# Patient Record
Sex: MALE | Race: WHITE | NOT HISPANIC OR LATINO | Employment: FULL TIME | URBAN - METROPOLITAN AREA
[De-identification: names, ages, dates, MRNs, and addresses within clinical notes are randomized per-mention and may not be internally consistent; named-entity substitution may affect disease eponyms.]

---

## 2017-06-07 ENCOUNTER — OFFICE VISIT (OUTPATIENT)
Dept: SPORTS MEDICINE | Facility: CLINIC | Age: 32
End: 2017-06-07

## 2017-06-07 VITALS
RESPIRATION RATE: 16 BRPM | HEIGHT: 71 IN | OXYGEN SATURATION: 99 % | DIASTOLIC BLOOD PRESSURE: 84 MMHG | BODY MASS INDEX: 24.16 KG/M2 | SYSTOLIC BLOOD PRESSURE: 110 MMHG | WEIGHT: 172.6 LBS | HEART RATE: 62 BPM

## 2017-06-07 DIAGNOSIS — K64.4 EXTERNAL HEMORRHOID: Primary | ICD-10-CM

## 2017-06-07 PROCEDURE — 99213 OFFICE O/P EST LOW 20 MIN: CPT | Performed by: FAMILY MEDICINE

## 2017-06-07 RX ORDER — POLYETHYLENE GLYCOL 3350 17 G/17G
17 POWDER, FOR SOLUTION ORAL DAILY
Qty: 500 G | Refills: 1 | Status: SHIPPED | OUTPATIENT
Start: 2017-06-07

## 2017-06-07 NOTE — PROGRESS NOTES
"Tawanda is a 32 y.o. year old male    Chief Complaint   Patient presents with   • Hemorrhoids     wants to discuss treatment or possible referral       History of Present Illness   HPI   Rectal pain on and off for a few years, worsened last week. Sharp pain with BM and wiping, no bleeding. No change to BMs. No increased exercise. Somewhat better in the past few days. Assoc bulging sensation.     I have reviewed the patient's medical history in detail and updated the computerized patient record.    Review of Systems   Constitutional: Negative.    Gastrointestinal: Negative for blood in stool.       /84 (BP Location: Left arm, Patient Position: Sitting, Cuff Size: Adult)  Pulse 62  Resp 16  Ht 71\" (180.3 cm)  Wt 172 lb 9.6 oz (78.3 kg)  SpO2 99%  BMI 24.07 kg/m2     Physical Exam   Constitutional: He appears well-developed and well-nourished.   Genitourinary: Rectal exam shows external hemorrhoid (2:00, about 5mm diameter).   Vitals reviewed.       Diagnoses and all orders for this visit:    External hemorrhoid    Other orders  -     hydrocortisone 2.5 % cream; Apply  topically 2 (Two) Times a Day.  -     polyethylene glycol (MIRALAX) powder; Take 17 g by mouth Daily.  Discussed management of resolving hemorrhoids in detail.  "

## 2019-02-05 ENCOUNTER — OFFICE VISIT (OUTPATIENT)
Dept: SPORTS MEDICINE | Facility: CLINIC | Age: 34
End: 2019-02-05

## 2019-02-05 VITALS
SYSTOLIC BLOOD PRESSURE: 100 MMHG | WEIGHT: 159 LBS | DIASTOLIC BLOOD PRESSURE: 70 MMHG | BODY MASS INDEX: 22.26 KG/M2 | HEIGHT: 71 IN

## 2019-02-05 DIAGNOSIS — R19.7 DIARRHEA OF PRESUMED INFECTIOUS ORIGIN: Primary | ICD-10-CM

## 2019-02-05 PROCEDURE — 99213 OFFICE O/P EST LOW 20 MIN: CPT | Performed by: FAMILY MEDICINE

## 2019-02-05 NOTE — PROGRESS NOTES
"Tawanda is a 34 y.o. year old male    Chief Complaint   Patient presents with   • Diarrhea     x Thursday evening, severe, min appetite, cramping, bloating        History of Present Illness   HPI   Illness started Thursday evening with upset stomach and loss of appetite, worsened over the following days. Felt chilled poss low grade fever. Diarrhea started Saturday, intense for several hours, better with Imodium. Again had intense diarrhea Monday (yesterday) morning, again improved with Imodium. Yesterday evening again but did not improve. Somewhat better this morning. Watery, no blood.  No vomiting. Tolerating fluids and some solids just no appetite.     I have reviewed the patient's medical, family, and social history in detail and updated the computerized patient record.    Review of Systems   Constitutional: Positive for fatigue.   Eyes: Negative.    Respiratory: Negative for shortness of breath.    Gastrointestinal: Negative for blood in stool and vomiting.   Genitourinary: Negative for difficulty urinating.   Musculoskeletal: Negative for arthralgias.   Skin: Negative for rash.       /70 (BP Location: Right arm, Patient Position: Sitting, Cuff Size: Large Adult)   Ht 180.3 cm (70.98\")   Wt 72.1 kg (159 lb)   BMI 22.19 kg/m²      Physical Exam   Constitutional: He appears well-developed and well-nourished. No distress.   HENT:   Mouth/Throat: Oropharynx is clear and moist.   Eyes: Conjunctivae are normal. Pupils are equal, round, and reactive to light.   Neck: Normal range of motion.   Pulmonary/Chest: Effort normal and breath sounds normal.   Abdominal: Soft. Bowel sounds are normal. He exhibits no distension and no mass. There is no rebound and no guarding.   Nonspecific generalized mild tenderness. No organomegaly   Skin: Skin is warm and dry. He is not diaphoretic.   Psychiatric: He has a normal mood and affect.   Nursing note and vitals reviewed.        Current Outpatient Medications:   •  " hydrocortisone 2.5 % cream, Apply  topically 2 (Two) Times a Day., Disp: 28 g, Rfl: 2  •  polyethylene glycol (MIRALAX) powder, Take 17 g by mouth Daily., Disp: 500 g, Rfl: 1     Diagnoses and all orders for this visit:    Diarrhea of presumed infectious origin    Suspect prolonged viral illness. Discussed supportive care. F/u if not improving or red flag symptoms appear.      I spent greater than 50% of this 15 minute visit discussing the diagnosis, prognosis, treatment plan, etc. Patient's questions were answered in detail with appropriate counseling and anticipatory guidance.          EMR Dragon/Transcription disclaimer:    Much of this encounter note is an electronic transcription/translation of spoken language to printed text.  The electronic translation of spoken language may permit erroneous, or at times, nonsensical words or phrases to be inadvertently transcribed.  Although I have reviewed the note for such errors some may still exist.

## 2019-02-07 ENCOUNTER — TELEPHONE (OUTPATIENT)
Dept: SPORTS MEDICINE | Facility: CLINIC | Age: 34
End: 2019-02-07

## 2019-02-07 RX ORDER — CIPROFLOXACIN 500 MG/1
500 TABLET, FILM COATED ORAL 2 TIMES DAILY
Qty: 14 TABLET | Refills: 0 | Status: SHIPPED | OUTPATIENT
Start: 2019-02-07

## 2019-02-07 NOTE — TELEPHONE ENCOUNTER
I sent in cipro to his pharmacy. Let's have him schedule follow up Monday but if he still has fevers after 2-3 doses of the antibiotic he needs to go to the ER.

## 2019-02-07 NOTE — TELEPHONE ENCOUNTER
PT called in stating that he took his temp under his arm and it was 101.3. He stated that you advised for him to reach out if he's had any new symptoms. If you call something in he would like for it to go to [facility].     He stated that he hasn't had any more diarrhea, but still cramping and bloating.     Please advise, thank you.

## 2021-04-16 ENCOUNTER — BULK ORDERING (OUTPATIENT)
Dept: CASE MANAGEMENT | Facility: OTHER | Age: 36
End: 2021-04-16

## 2021-04-16 DIAGNOSIS — Z23 IMMUNIZATION DUE: ICD-10-CM

## 2023-04-04 ENCOUNTER — TELEPHONE (OUTPATIENT)
Dept: SPORTS MEDICINE | Facility: CLINIC | Age: 38
End: 2023-04-04
Payer: COMMERCIAL

## 2023-04-04 NOTE — TELEPHONE ENCOUNTER
Patient called to re-establish care with you. Patient stated that he has not been seen with you for a physical because back in 2019 he received a letter from our office stating that he was not in network.   Patient stated that he had [employer].     Please advise if I am able to schedule or if I should provide him with the information for [address].    Marilee

## 2023-05-09 ENCOUNTER — OFFICE VISIT (OUTPATIENT)
Dept: SPORTS MEDICINE | Facility: CLINIC | Age: 38
End: 2023-05-09
Payer: COMMERCIAL

## 2023-05-09 ENCOUNTER — LAB (OUTPATIENT)
Dept: LAB | Facility: HOSPITAL | Age: 38
End: 2023-05-09
Payer: COMMERCIAL

## 2023-05-09 VITALS
DIASTOLIC BLOOD PRESSURE: 60 MMHG | SYSTOLIC BLOOD PRESSURE: 122 MMHG | OXYGEN SATURATION: 98 % | WEIGHT: 173.8 LBS | HEART RATE: 62 BPM | BODY MASS INDEX: 24.33 KG/M2 | TEMPERATURE: 98.2 F | HEIGHT: 71 IN

## 2023-05-09 DIAGNOSIS — Z12.5 SCREENING FOR PROSTATE CANCER: ICD-10-CM

## 2023-05-09 DIAGNOSIS — N41.9 PROSTATITIS, UNSPECIFIED PROSTATITIS TYPE: ICD-10-CM

## 2023-05-09 DIAGNOSIS — L23.7 POISON IVY DERMATITIS: ICD-10-CM

## 2023-05-09 DIAGNOSIS — Z00.00 ANNUAL PHYSICAL EXAM: Primary | ICD-10-CM

## 2023-05-09 LAB
ALBUMIN SERPL-MCNC: 4.7 G/DL (ref 3.5–5.2)
ALBUMIN/GLOB SERPL: 1.7 G/DL
ALP SERPL-CCNC: 42 U/L (ref 39–117)
ALT SERPL W P-5'-P-CCNC: 38 U/L (ref 1–41)
ANION GAP SERPL CALCULATED.3IONS-SCNC: 7.7 MMOL/L (ref 5–15)
AST SERPL-CCNC: 24 U/L (ref 1–40)
BASOPHILS # BLD AUTO: 0.04 10*3/MM3 (ref 0–0.2)
BASOPHILS NFR BLD AUTO: 0.8 % (ref 0–1.5)
BILIRUB SERPL-MCNC: 0.6 MG/DL (ref 0–1.2)
BILIRUB UR QL STRIP: NEGATIVE
BUN SERPL-MCNC: 8 MG/DL (ref 6–20)
BUN/CREAT SERPL: 10.1 (ref 7–25)
CALCIUM SPEC-SCNC: 10.2 MG/DL (ref 8.6–10.5)
CHLORIDE SERPL-SCNC: 103 MMOL/L (ref 98–107)
CHOLEST SERPL-MCNC: 259 MG/DL (ref 0–200)
CLARITY UR: CLEAR
CO2 SERPL-SCNC: 27.3 MMOL/L (ref 22–29)
COLOR UR: YELLOW
CREAT SERPL-MCNC: 0.79 MG/DL (ref 0.76–1.27)
DEPRECATED RDW RBC AUTO: 38 FL (ref 37–54)
EGFRCR SERPLBLD CKD-EPI 2021: 116.6 ML/MIN/1.73
EOSINOPHIL # BLD AUTO: 0.13 10*3/MM3 (ref 0–0.4)
EOSINOPHIL NFR BLD AUTO: 2.7 % (ref 0.3–6.2)
ERYTHROCYTE [DISTWIDTH] IN BLOOD BY AUTOMATED COUNT: 11.9 % (ref 12.3–15.4)
GLOBULIN UR ELPH-MCNC: 2.7 GM/DL
GLUCOSE SERPL-MCNC: 103 MG/DL (ref 65–99)
GLUCOSE UR STRIP-MCNC: NEGATIVE MG/DL
HBA1C MFR BLD: 5.2 % (ref 4.8–5.6)
HCT VFR BLD AUTO: 44.5 % (ref 37.5–51)
HDLC SERPL QL: 6.48
HDLC SERPL-MCNC: 40 MG/DL (ref 40–60)
HGB BLD-MCNC: 14.9 G/DL (ref 13–17.7)
HGB UR QL STRIP.AUTO: NEGATIVE
IMM GRANULOCYTES # BLD AUTO: 0.02 10*3/MM3 (ref 0–0.05)
IMM GRANULOCYTES NFR BLD AUTO: 0.4 % (ref 0–0.5)
KETONES UR QL STRIP: NEGATIVE
LDLC SERPL CALC-MCNC: 167 MG/DL (ref 0–100)
LEUKOCYTE ESTERASE UR QL STRIP.AUTO: NEGATIVE
LYMPHOCYTES # BLD AUTO: 1.56 10*3/MM3 (ref 0.7–3.1)
LYMPHOCYTES NFR BLD AUTO: 32 % (ref 19.6–45.3)
MCH RBC QN AUTO: 29.4 PG (ref 26.6–33)
MCHC RBC AUTO-ENTMCNC: 33.5 G/DL (ref 31.5–35.7)
MCV RBC AUTO: 87.8 FL (ref 79–97)
MONOCYTES # BLD AUTO: 0.36 10*3/MM3 (ref 0.1–0.9)
MONOCYTES NFR BLD AUTO: 7.4 % (ref 5–12)
NEUTROPHILS NFR BLD AUTO: 2.76 10*3/MM3 (ref 1.7–7)
NEUTROPHILS NFR BLD AUTO: 56.7 % (ref 42.7–76)
NITRITE UR QL STRIP: NEGATIVE
NRBC BLD AUTO-RTO: 0 /100 WBC (ref 0–0.2)
PH UR STRIP.AUTO: 7 [PH] (ref 5–8)
PLATELET # BLD AUTO: 272 10*3/MM3 (ref 140–450)
PMV BLD AUTO: 11.1 FL (ref 6–12)
POTASSIUM SERPL-SCNC: 4.5 MMOL/L (ref 3.5–5.2)
PROT SERPL-MCNC: 7.4 G/DL (ref 6–8.5)
PROT UR QL STRIP: NEGATIVE
PSA SERPL-MCNC: 1.1 NG/ML (ref 0–4)
RBC # BLD AUTO: 5.07 10*6/MM3 (ref 4.14–5.8)
SODIUM SERPL-SCNC: 138 MMOL/L (ref 136–145)
SP GR UR STRIP: 1.02 (ref 1–1.03)
TRIGL SERPL-MCNC: 275 MG/DL (ref 0–150)
UROBILINOGEN UR QL STRIP: NORMAL
VLDLC SERPL-MCNC: 52 MG/DL (ref 5–40)
WBC NRBC COR # BLD: 4.87 10*3/MM3 (ref 3.4–10.8)

## 2023-05-09 PROCEDURE — 80061 LIPID PANEL: CPT | Performed by: FAMILY MEDICINE

## 2023-05-09 PROCEDURE — G0103 PSA SCREENING: HCPCS | Performed by: FAMILY MEDICINE

## 2023-05-09 PROCEDURE — 81003 URINALYSIS AUTO W/O SCOPE: CPT | Performed by: FAMILY MEDICINE

## 2023-05-09 PROCEDURE — 84443 ASSAY THYROID STIM HORMONE: CPT | Performed by: FAMILY MEDICINE

## 2023-05-09 PROCEDURE — 83036 HEMOGLOBIN GLYCOSYLATED A1C: CPT | Performed by: FAMILY MEDICINE

## 2023-05-09 PROCEDURE — 85025 COMPLETE CBC W/AUTO DIFF WBC: CPT | Performed by: FAMILY MEDICINE

## 2023-05-09 PROCEDURE — 36415 COLL VENOUS BLD VENIPUNCTURE: CPT | Performed by: FAMILY MEDICINE

## 2023-05-09 PROCEDURE — 80053 COMPREHEN METABOLIC PANEL: CPT | Performed by: FAMILY MEDICINE

## 2023-05-09 PROCEDURE — 99385 PREV VISIT NEW AGE 18-39: CPT | Performed by: FAMILY MEDICINE

## 2023-05-09 RX ORDER — PREDNISONE 20 MG/1
TABLET ORAL
Qty: 18 TABLET | Refills: 0 | Status: SHIPPED | OUTPATIENT
Start: 2023-05-09

## 2023-05-09 RX ORDER — SULFAMETHOXAZOLE AND TRIMETHOPRIM 800; 160 MG/1; MG/1
1 TABLET ORAL 2 TIMES DAILY
Qty: 20 TABLET | Refills: 0 | Status: SHIPPED | OUTPATIENT
Start: 2023-05-09

## 2023-05-09 NOTE — PROGRESS NOTES
"Tawanda ZAPATA Dwaine is here today for an annual physical exam.       Poison ivy  Lower abdominal pain - discomft in particular with ejaculation. Vasectomy few months ago.  Recent heart flutters, several nonpainful in cluster. Resolved now.   Seeing Dr. Nix for eustachian tube dysfunction  Occasional gerd symptoms.     PHQ-2 Depression Screening  Little interest or pleasure in doing things? 0-->not at all   Feeling down, depressed, or hopeless? 0-->not at all   PHQ-2 Total Score 0       Health Maintenance   Topic Date Due   • HEPATITIS C SCREENING  Never done   • ANNUAL PHYSICAL  12/08/2017   • INFLUENZA VACCINE  08/01/2023   • TDAP/TD VACCINES (2 - Td or Tdap) 06/30/2032   • COVID-19 Vaccine  Completed   • Pneumococcal Vaccine 0-64  Aged Out       Review of Systems    /60 (BP Location: Left arm, Patient Position: Sitting, Cuff Size: Adult)   Pulse 62   Temp 98.2 °F (36.8 °C) (Temporal)   Ht 180.3 cm (70.98\")   Wt 78.8 kg (173 lb 12.8 oz)   SpO2 98%   BMI 24.25 kg/m²      Physical Exam    Vital signs reviewed.  General appearance: No acute distress  Eyes: conjunctiva clear without erythema; pupils equally round and reactive  ENT: external ears normal; hearing normal  Neck: supple; no thyromegaly  CV: normal rate and rhythm; no peripheral edema  Respiratory: normal respiratory effort; lungs clear to auscultation bilaterally  MSK: normal gait and station; no focal joint deformity or swelling  Skin: no rash or wounds; normal turgor  Neuro: cranial nerves 2-12 grossly intact; normal sensation to light touch  Psych: mood and affect normal; recent and remote memory intact      Current Outpatient Medications:   •  predniSONE (DELTASONE) 20 MG tablet, Take 3 tabs daily for 3 days, then 2 daily for 3 days, then 1 daily for 3 days, Disp: 18 tablet, Rfl: 0  •  sulfamethoxazole-trimethoprim (BACTRIM DS,SEPTRA DS) 800-160 MG per tablet, Take 1 tablet by mouth 2 (Two) Times a Day., Disp: 20 tablet, Rfl: 0    Diagnoses " and all orders for this visit:    1. Annual physical exam (Primary)  -     CBC & Differential  -     Comprehensive Metabolic Panel  -     Lipid Panel With / Chol / HDL Ratio  -     Thyroid Cascade Profile  -     Urinalysis With Culture If Indicated - Urine, Clean Catch  -     Hemoglobin A1c  -     PSA Screen    2. Poison ivy dermatitis  -     predniSONE (DELTASONE) 20 MG tablet; Take 3 tabs daily for 3 days, then 2 daily for 3 days, then 1 daily for 3 days  Dispense: 18 tablet; Refill: 0    3. Prostatitis, unspecified prostatitis type  -     PSA Screen  -     sulfamethoxazole-trimethoprim (BACTRIM DS,SEPTRA DS) 800-160 MG per tablet; Take 1 tablet by mouth 2 (Two) Times a Day.  Dispense: 20 tablet; Refill: 0    4. Screening for prostate cancer  -     PSA Screen        Encourage healthy diet and exercise.  Encourage patient to stay up to date on screening examinations as indicated based on age and risk factors.  Clinically suspect prostatitis, will treat with Bactrim.  Keep his scheduled check with urology next month.  Steroid for lingering poison ivy.  Discussed reflux management with diet, Pepcid or Tums.  If symptoms are more frequent recommend a course of PPI.    EMR Dragon/Transcription disclaimer:    Much of this encounter note is an electronic transcription/translation of spoken language to printed text.  The electronic translation of spoken language may permit erroneous, or at times, nonsensical words or phrases to be inadvertently transcribed.  Although I have reviewed the note for such errors some may still exist.

## 2023-05-10 LAB — TSH SERPL DL<=0.005 MIU/L-ACNC: 1.91 UIU/ML (ref 0.45–4.5)

## 2023-05-11 ENCOUNTER — PATIENT MESSAGE (OUTPATIENT)
Dept: SPORTS MEDICINE | Facility: CLINIC | Age: 38
End: 2023-05-11
Payer: COMMERCIAL

## 2023-05-11 DIAGNOSIS — N41.9 PROSTATITIS, UNSPECIFIED PROSTATITIS TYPE: ICD-10-CM

## 2023-05-12 NOTE — TELEPHONE ENCOUNTER
From: Tawanda Isidro  To: Kamran Mora  Sent: 5/11/2023 2:09 PM EDT  Subject: Test results    I'm concerned about my cholesterol and triglyceride levels, especially considering some of my family history and the fact that I've been on a mostly vegetarian diet for years. I'm already trying to eat more fiber, less sugar and fat, and increase my activity level. Do I need a follow up visit to discuss my test results?

## 2023-05-17 RX ORDER — SULFAMETHOXAZOLE AND TRIMETHOPRIM 800; 160 MG/1; MG/1
1 TABLET ORAL 2 TIMES DAILY
Qty: 20 TABLET | Refills: 0 | Status: SHIPPED | OUTPATIENT
Start: 2023-05-17

## 2023-08-10 DIAGNOSIS — E78.00 ELEVATED CHOLESTEROL: Primary | ICD-10-CM

## 2023-08-15 ENCOUNTER — LAB (OUTPATIENT)
Dept: LAB | Facility: HOSPITAL | Age: 38
End: 2023-08-15
Payer: COMMERCIAL

## 2023-08-15 LAB
CHOLEST SERPL-MCNC: 221 MG/DL (ref 0–200)
HDLC SERPL-MCNC: 39 MG/DL (ref 40–60)
LDLC SERPL CALC-MCNC: 152 MG/DL (ref 0–100)
LDLC/HDLC SERPL: 3.82 {RATIO}
TRIGL SERPL-MCNC: 166 MG/DL (ref 0–150)
VLDLC SERPL-MCNC: 30 MG/DL (ref 5–40)

## 2023-08-15 PROCEDURE — 80061 LIPID PANEL: CPT | Performed by: FAMILY MEDICINE

## 2023-08-16 ENCOUNTER — OFFICE VISIT (OUTPATIENT)
Dept: SPORTS MEDICINE | Facility: CLINIC | Age: 38
End: 2023-08-16
Payer: COMMERCIAL

## 2023-08-16 VITALS
TEMPERATURE: 98.2 F | HEART RATE: 62 BPM | OXYGEN SATURATION: 98 % | HEIGHT: 71 IN | DIASTOLIC BLOOD PRESSURE: 62 MMHG | SYSTOLIC BLOOD PRESSURE: 110 MMHG | BODY MASS INDEX: 21.28 KG/M2 | WEIGHT: 152 LBS

## 2023-08-16 DIAGNOSIS — E78.2 MIXED HYPERLIPIDEMIA: Primary | ICD-10-CM

## 2023-08-16 DIAGNOSIS — Z82.49 FAMILY HISTORY OF CORONARY ARTERY DISEASE: ICD-10-CM

## 2023-08-16 DIAGNOSIS — R10.30 LOWER ABDOMINAL PAIN: ICD-10-CM

## 2023-08-16 PROCEDURE — 99214 OFFICE O/P EST MOD 30 MIN: CPT | Performed by: FAMILY MEDICINE

## 2023-08-16 RX ORDER — CIPROFLOXACIN 500 MG/1
500 TABLET, FILM COATED ORAL 2 TIMES DAILY
Qty: 28 TABLET | Refills: 1 | Status: SHIPPED | OUTPATIENT
Start: 2023-08-16

## 2023-08-16 NOTE — PROGRESS NOTES
"Tawanda is a 38 y.o. year old male    Chief Complaint   Patient presents with    Abdominal Pain     ABDOMINAL PAIN- Patient is here to follow up on abdominal pain, patient has seen urologist and he continues to have abdominal pain. Patient also would like to follow up on recent lab results.        History of Present Illness   HPI   Here for ongoing left elbow pain.  Since his last visit with me he took 2 rounds of Bactrim as well as doxycycline from his urologist for possible prostatitis.  He is not sure if he really has had any improvement.  It is typically worse in the evening.  He does note some ongoing pain with ejaculation.  He does also note that he has pain that is worse when he is sitting.  He had a reassuring CT scan by urology.    Also follow-up on his hyperlipidemia.  He is working on improving his diet.  Strong family history of severely high lipids and early coronary artery disease.    Review of Systems    /62 (BP Location: Left arm, Patient Position: Sitting, Cuff Size: Adult)   Pulse 62   Temp 98.2 øF (36.8 øC) (Temporal)   Ht 180.3 cm (70.98\")   Wt 68.9 kg (152 lb)   SpO2 98%   BMI 21.21 kg/mý          Physical Exam  Vitals reviewed.   Pulmonary:      Effort: Pulmonary effort is normal.   Neurological:      Mental Status: He is alert.   Psychiatric:         Mood and Affect: Mood normal.         Current Outpatient Medications:     ciprofloxacin (Cipro) 500 MG tablet, Take 1 tablet by mouth 2 (Two) Times a Day., Disp: 28 tablet, Rfl: 1     Diagnoses and all orders for this visit:    Mixed hyperlipidemia  -     CT Cardiac Calcium Score Without Dye; Future    Family history of coronary artery disease  -     CT Cardiac Calcium Score Without Dye; Future    Lower abdominal pain  -     ciprofloxacin (Cipro) 500 MG tablet; Take 1 tablet by mouth 2 (Two) Times a Day.       Regarding his chronic lower abdominal pain I agree this still seems most consistent with lingering symptoms of prostatitis.  I " think we can give 1 more round of Cipro for better microbial coverage and see if he has any success there.  If he notes improvement the first 10 days, he will refill now.  We did discuss possible risk of muscle tendon injury with this.  I reviewed his reassuring CT scan performed at urology.  Regarding his lipids, his repeat labs are improved.  However given his family history of CAD CT calcium score will be done for restratification.      EMR Dragon/Transcription disclaimer:    Much of this encounter note is an electronic transcription/translation of spoken language to printed text.  The electronic translation of spoken language may permit erroneous, or at times, nonsensical words or phrases to be inadvertently transcribed.  Although I have reviewed the note for such errors some may still exist.

## 2023-09-08 ENCOUNTER — HOSPITAL ENCOUNTER (OUTPATIENT)
Dept: CT IMAGING | Facility: HOSPITAL | Age: 38
Discharge: HOME | End: 2023-09-08
Admitting: FAMILY MEDICINE

## 2023-09-08 DIAGNOSIS — Z82.49 FAMILY HISTORY OF CORONARY ARTERY DISEASE: ICD-10-CM

## 2023-09-08 DIAGNOSIS — E78.2 MIXED HYPERLIPIDEMIA: ICD-10-CM

## 2023-09-08 PROCEDURE — 75571 CT HRT W/O DYE W/CA TEST: CPT

## 2023-09-14 ENCOUNTER — OFFICE VISIT (OUTPATIENT)
Dept: SPORTS MEDICINE | Facility: CLINIC | Age: 38
End: 2023-09-14
Payer: COMMERCIAL

## 2023-09-14 VITALS
DIASTOLIC BLOOD PRESSURE: 80 MMHG | BODY MASS INDEX: 21.28 KG/M2 | OXYGEN SATURATION: 99 % | TEMPERATURE: 98.2 F | RESPIRATION RATE: 16 BRPM | SYSTOLIC BLOOD PRESSURE: 116 MMHG | HEIGHT: 71 IN | WEIGHT: 152 LBS | HEART RATE: 78 BPM

## 2023-09-14 DIAGNOSIS — E78.2 MIXED HYPERLIPIDEMIA: ICD-10-CM

## 2023-09-14 DIAGNOSIS — R10.30 LOWER ABDOMINAL PAIN: ICD-10-CM

## 2023-09-14 DIAGNOSIS — M89.9 LESION OF BONE OF THORACIC SPINE: Primary | ICD-10-CM

## 2023-09-14 PROCEDURE — 99214 OFFICE O/P EST MOD 30 MIN: CPT | Performed by: FAMILY MEDICINE

## 2023-09-14 NOTE — PROGRESS NOTES
"Tawanda is a 38 y.o. year old male    Chief Complaint   Patient presents with    Back Pain     Cardiac calcium score test uncovered a possible tumor on his spine. Pt would like to discuss this and a possible colonoscopy        History of Present Illness   HPI   Here today for follow-up on multiple concerns.  First, we reviewed his coronary calcium score of 0.  This is excellent news.  This does incidentally show some streaking in the aorta which may correlate with his chronic hyperlipidemia as an early sign of atherosclerotic disease.  It also shows a lesion in the lower thoracic spine, possible hemangioma.  He reports some low-level chronic mid back pain.    He continues to have lower abdominal pain, not resolved with a few rounds of antibiotics for possible prostatitis.    Review of Systems    /80 (BP Location: Right arm, Patient Position: Sitting, Cuff Size: Adult)   Pulse 78   Temp 98.2 °F (36.8 °C)   Resp 16   Ht 180.3 cm (70.98\")   Wt 68.9 kg (152 lb)   SpO2 99%   BMI 21.21 kg/m²          Physical Exam  Vitals reviewed.   Pulmonary:      Effort: Pulmonary effort is normal.   Musculoskeletal:      Comments: He localizes some chronic left-sided back pain in the mid-upper left-sided thoracic paraspinal muscles.  There are some palpable trigger points there.   Neurological:      Mental Status: He is alert.   Psychiatric:         Mood and Affect: Mood normal.       No current outpatient medications on file.     Diagnoses and all orders for this visit:    Lesion of bone of thoracic spine    Lower abdominal pain  -     Ambulatory Referral to Gastroenterology    Mixed hyperlipidemia       Reviewed and discussed his calcium score.  This is very reassuring.  The findings in the aortic could certainly indicate some early atherosclerotic changes, but less measurable concern there.  He will continue lifestyle measures for his lipids.  I reviewed the images of his calcium score, this does look like a benign " finding in the central aspect of the lower thoracic vertebral body.  It is difficult to clarify which level given that these are only axial images, but clearly lower thoracic as it is at the level of the spleen.  He has an outside CT scan from urology that we will look at to see if it can confirm this is a hemangioma, if not we will plan a contrast MRI.  Given his chronic lower abdominal pain and no clear findings we will get a second look from gastroenterology.      EMR Dragon/Transcription disclaimer:    Much of this encounter note is an electronic transcription/translation of spoken language to printed text.  The electronic translation of spoken language may permit erroneous, or at times, nonsensical words or phrases to be inadvertently transcribed.  Although I have reviewed the note for such errors some may still exist.

## 2023-10-24 ENCOUNTER — OFFICE VISIT (OUTPATIENT)
Dept: GASTROENTEROLOGY | Facility: CLINIC | Age: 38
End: 2023-10-24
Payer: COMMERCIAL

## 2023-10-24 ENCOUNTER — PREP FOR SURGERY (OUTPATIENT)
Dept: SURGERY | Facility: SURGERY CENTER | Age: 38
End: 2023-10-24
Payer: COMMERCIAL

## 2023-10-24 VITALS
SYSTOLIC BLOOD PRESSURE: 104 MMHG | WEIGHT: 171.1 LBS | HEART RATE: 65 BPM | DIASTOLIC BLOOD PRESSURE: 82 MMHG | TEMPERATURE: 98.2 F | OXYGEN SATURATION: 98 % | BODY MASS INDEX: 23.95 KG/M2 | HEIGHT: 71 IN

## 2023-10-24 DIAGNOSIS — R10.30 LOWER ABDOMINAL PAIN: Primary | ICD-10-CM

## 2023-10-24 DIAGNOSIS — R10.30 LOWER ABDOMINAL PAIN: Primary | Chronic | ICD-10-CM

## 2023-10-24 DIAGNOSIS — K64.9 HEMORRHOIDS, UNSPECIFIED HEMORRHOID TYPE: Chronic | ICD-10-CM

## 2023-10-24 PROCEDURE — 99204 OFFICE O/P NEW MOD 45 MIN: CPT | Performed by: NURSE PRACTITIONER

## 2023-10-24 RX ORDER — SODIUM CHLORIDE, SODIUM LACTATE, POTASSIUM CHLORIDE, CALCIUM CHLORIDE 600; 310; 30; 20 MG/100ML; MG/100ML; MG/100ML; MG/100ML
30 INJECTION, SOLUTION INTRAVENOUS CONTINUOUS PRN
OUTPATIENT
Start: 2023-10-24

## 2023-10-24 RX ORDER — SODIUM CHLORIDE 0.9 % (FLUSH) 0.9 %
3 SYRINGE (ML) INJECTION EVERY 12 HOURS SCHEDULED
OUTPATIENT
Start: 2023-10-24

## 2023-10-24 RX ORDER — SODIUM CHLORIDE 0.9 % (FLUSH) 0.9 %
10 SYRINGE (ML) INJECTION AS NEEDED
OUTPATIENT
Start: 2023-10-24

## 2023-10-24 NOTE — PROGRESS NOTES
"Chief Complaint   Patient presents with    Abdominal Pain         History of Present Illness  38-year-old male presents the office today for evaluation of lower abdominal pain.  CT of the abdomen pelvis performed on 8/2/2023 revealed hepatic cyst and a partially contracted gallbladder.  Small fat-containing periumbilical hernia was noted.  Shotty nodes were noted in the central mesentery, and were likely felt to be reactive.    He reports that in the early summer to end of summer he was having almost daily feelings of bloating and lower abdominal discomfort. He also felt like he was having gas pain. He tried Gas-X OTC with some relief. He felt that ibuprofen helped more than anything, although he does not feel that the discomfort was musculoskeletal. He has seen a urologist for previous pain with ejaculation. CT imaging and urinalysis were unremarkable. Symptom has decreased in frequency and is not as severe. He does not have a urology follow up at this time.     He reports having regular Bms. He denies any rectal bleeding. He denies any family history of colon cancer. He denies any weight loss. He reports having hemorrhoids that have been present for a while. Occasionally they will flare and cause discomfort when wiping. He tries not to strain with Bms. He does not having any bleeding from hemorrhoids.     He reports occasionally due to foods he eats, such as fried or spicy foods. He will take TUMs and symptoms are well managed.        Result Review :       SCANNED - IMAGING (08/02/2023)   PSA Screen (05/09/2023 09:17)   Urinalysis With Culture If Indicated - Urine, Clean Catch (05/09/2023 09:17)     Vital Signs:   /82   Pulse 65   Temp 98.2 °F (36.8 °C)   Ht 180.3 cm (71\")   Wt 77.6 kg (171 lb 1.6 oz)   SpO2 98%   BMI 23.86 kg/m²     Body mass index is 23.86 kg/m².     Physical Exam  Vitals reviewed.   Constitutional:       Appearance: Normal appearance.   HENT:      Head: Normocephalic.      Nose: " Nose normal.      Mouth/Throat:      Mouth: Mucous membranes are moist.   Eyes:      General: No scleral icterus.     Extraocular Movements: Extraocular movements intact.   Cardiovascular:      Rate and Rhythm: Normal rate and regular rhythm.      Pulses: Normal pulses.      Heart sounds: Normal heart sounds. No murmur heard.  Pulmonary:      Effort: Pulmonary effort is normal. No respiratory distress.      Breath sounds: Normal breath sounds.   Abdominal:      General: Abdomen is flat. Bowel sounds are normal. There is no distension.      Palpations: Abdomen is soft. There is no mass.      Tenderness: There is no abdominal tenderness. There is no guarding.   Musculoskeletal:         General: Normal range of motion.      Cervical back: Normal range of motion and neck supple.   Skin:     General: Skin is warm and dry.      Coloration: Skin is not jaundiced.   Neurological:      General: No focal deficit present.      Mental Status: He is alert and oriented to person, place, and time.   Psychiatric:         Mood and Affect: Mood normal.         Behavior: Behavior normal.         Thought Content: Thought content normal.         Judgment: Judgment normal.       Assessment and Plan    Diagnoses and all orders for this visit:    1. Lower abdominal pain (Primary)    2. Hemorrhoids, unspecified hemorrhoid type          Patient Instructions   For further evaluation of lower abdominal pain we will schedule a colonoscopy.     2.   Continue to follow a high fiber diet for management of hemorrhoids.       Discussion:  Patient was fully evaluated through urology with CT scan and lab work and findings were negative and patient was not recommended to have any type of follow-up.  We will proceed with colonoscopy for further evaluation of nondescript lower abdominal discomfort.  Patient was recommended continue high-fiber diet for management of his hemorrhoids.  Rectal cream was offered, but patient deferred at this time.  Additional  recommendations pending outcome of colonoscopy.  Patient verbalized understanding of above plan of care and is in agreement.  All questions answered and support provided.    EMR Dragon/Transcription Disclaimer:  This document has been Dictated utilizing Dragon dictation.

## 2023-10-24 NOTE — PATIENT INSTRUCTIONS
For further evaluation of lower abdominal pain we will schedule a colonoscopy.     2.   Continue to follow a high fiber diet for management of hemorrhoids.

## 2023-12-14 ENCOUNTER — ANESTHESIA (OUTPATIENT)
Dept: SURGERY | Facility: SURGERY CENTER | Age: 38
End: 2023-12-14
Payer: COMMERCIAL

## 2023-12-14 ENCOUNTER — ANESTHESIA EVENT (OUTPATIENT)
Dept: SURGERY | Facility: SURGERY CENTER | Age: 38
End: 2023-12-14
Payer: COMMERCIAL

## 2023-12-14 ENCOUNTER — HOSPITAL ENCOUNTER (OUTPATIENT)
Facility: SURGERY CENTER | Age: 38
Setting detail: HOSPITAL OUTPATIENT SURGERY
Discharge: HOME | End: 2023-12-14
Attending: INTERNAL MEDICINE | Admitting: INTERNAL MEDICINE
Payer: COMMERCIAL

## 2023-12-14 VITALS
WEIGHT: 165 LBS | OXYGEN SATURATION: 98 % | SYSTOLIC BLOOD PRESSURE: 108 MMHG | RESPIRATION RATE: 16 BRPM | BODY MASS INDEX: 23.1 KG/M2 | TEMPERATURE: 98.7 F | HEIGHT: 71 IN | DIASTOLIC BLOOD PRESSURE: 79 MMHG | HEART RATE: 52 BPM

## 2023-12-14 DIAGNOSIS — R10.30 LOWER ABDOMINAL PAIN: ICD-10-CM

## 2023-12-14 PROCEDURE — 45378 DIAGNOSTIC COLONOSCOPY: CPT | Performed by: INTERNAL MEDICINE

## 2023-12-14 PROCEDURE — 25810000003 LACTATED RINGERS PER 1000 ML: Performed by: NURSE PRACTITIONER

## 2023-12-14 PROCEDURE — 25010000002 PROPOFOL 10 MG/ML EMULSION: Performed by: ANESTHESIOLOGY

## 2023-12-14 PROCEDURE — 25810000003 LACTATED RINGERS PER 1000 ML: Performed by: INTERNAL MEDICINE

## 2023-12-14 RX ORDER — MAGNESIUM HYDROXIDE 1200 MG/15ML
LIQUID ORAL AS NEEDED
Status: DISCONTINUED | OUTPATIENT
Start: 2023-12-14 | End: 2023-12-14 | Stop reason: HOSPADM

## 2023-12-14 RX ORDER — SODIUM CHLORIDE 0.9 % (FLUSH) 0.9 %
10 SYRINGE (ML) INJECTION AS NEEDED
Status: DISCONTINUED | OUTPATIENT
Start: 2023-12-14 | End: 2023-12-14 | Stop reason: HOSPADM

## 2023-12-14 RX ORDER — SODIUM CHLORIDE, SODIUM LACTATE, POTASSIUM CHLORIDE, CALCIUM CHLORIDE 600; 310; 30; 20 MG/100ML; MG/100ML; MG/100ML; MG/100ML
30 INJECTION, SOLUTION INTRAVENOUS CONTINUOUS PRN
Status: DISCONTINUED | OUTPATIENT
Start: 2023-12-14 | End: 2023-12-14 | Stop reason: HOSPADM

## 2023-12-14 RX ORDER — SODIUM CHLORIDE 0.9 % (FLUSH) 0.9 %
3 SYRINGE (ML) INJECTION EVERY 12 HOURS SCHEDULED
Status: DISCONTINUED | OUTPATIENT
Start: 2023-12-14 | End: 2023-12-14 | Stop reason: HOSPADM

## 2023-12-14 RX ADMIN — PROPOFOL 200 MCG/KG/MIN: 10 INJECTION, EMULSION INTRAVENOUS at 08:16

## 2023-12-14 RX ADMIN — SODIUM CHLORIDE, SODIUM LACTATE, POTASSIUM CHLORIDE, AND CALCIUM CHLORIDE: .6; .31; .03; .02 INJECTION, SOLUTION INTRAVENOUS at 08:16

## 2023-12-14 RX ADMIN — SODIUM CHLORIDE, POTASSIUM CHLORIDE, SODIUM LACTATE AND CALCIUM CHLORIDE 30 ML/HR: 600; 310; 30; 20 INJECTION, SOLUTION INTRAVENOUS at 07:32

## 2023-12-14 NOTE — ANESTHESIA POSTPROCEDURE EVALUATION
Patient: Tawanda Isidro    Procedure Summary       Date: 12/14/23 Room / Location: [facility]    Anesthesia Start: 0815 Anesthesia Stop: 0837    Procedure: COLONOSCOPY Diagnosis:       Lower abdominal pain      (Lower abdominal pain [R10.30])    Surgeons: Jimmy Rivers MD Provider: Best Dorantes MD    Anesthesia Type: MAC ASA Status: 2            Anesthesia Type: MAC    Vitals  Vitals Value Taken Time   BP 96/68 12/14/23 0846   Temp 37.1 °C (98.7 °F) 12/14/23 0835   Pulse 54 12/14/23 0846   Resp 16 12/14/23 0840   SpO2 96 % 12/14/23 0846   Vitals shown include unfiled device data.        Post Anesthesia Care and Evaluation    Patient location during evaluation: PACU  Patient participation: complete - patient participated  Level of consciousness: awake  Pain management: adequate    Airway patency: patent  Anesthetic complications: No anesthetic complications  PONV Status: none  Cardiovascular status: acceptable  Respiratory status: acceptable  Hydration status: acceptable    Comments: Patient seen and examined postoperatively; vital signs stable; SpO2 greater than or equal to 90%; cardiopulmonary status stable; nausea/vomiting adequately controlled; pain adequately controlled; no apparent anesthesia complications; patient discharged from anesthesia care when discharge criteria were met

## 2023-12-14 NOTE — H&P
No chief complaint on file.      HPI  Patient today for a colonoscopy due to lower abdominal pain.         Problem List:    Patient Active Problem List   Diagnosis    Lower abdominal pain       Medical History:    Past Medical History:   Diagnosis Date    Anxiety     stress induced        Social History:    Social History     Socioeconomic History    Marital status:    Tobacco Use    Smoking status: Never    Smokeless tobacco: Never    Tobacco comments:     I'd have an occasional cigarette when I was in my 20s. Never a regular smoker.   Vaping Use    Vaping Use: Never used   Substance and Sexual Activity    Alcohol use: Yes     Alcohol/week: 5.0 standard drinks of alcohol     Types: 2 Glasses of wine, 3 Cans of beer per week     Comment: Socially    Drug use: No    Sexual activity: Yes     Partners: Female     Birth control/protection: Vasectomy       Family History:   Family History   Problem Relation Age of Onset    Arthritis Mother         pseudogout/hypercalcemia    High cholesterol Mother     Colon polyps Father     Drug abuse Sister         ; adopted sister    Mental illness Sister     Drug abuse Maternal Aunt         recovering addict    Heart attack Maternal Uncle         passed away from heartattack    Alcohol abuse Paternal Uncle         recovering alcoholic    Stroke Maternal Grandmother     Diverticulitis Maternal Grandmother     Early death Maternal Grandfather         heart attack in his early 50s    Heart disease Maternal Grandfather     Cancer Paternal Grandfather         lung; long time smoker    Colon cancer Neg Hx     Crohn's disease Neg Hx     Irritable bowel syndrome Neg Hx     Ulcerative colitis Neg Hx        Surgical History:   Past Surgical History:   Procedure Laterality Date    ADENOIDECTOMY      HAND SURGERY  Summer 2022    Sutures to repair pinch injury right ring finger at [email or web address] [place]    TONSILLECTOMY      VASECTOMY  Fall     Diego Figueroa at  [facility]         Current Facility-Administered Medications:     lactated ringers infusion, 30 mL/hr, Intravenous, Continuous PRN, Eric, Belinda, APRN    sodium chloride 0.9 % flush 10 mL, 10 mL, Intravenous, PRN, Eric, Belinda, APRN    sodium chloride 0.9 % flush 3 mL, 3 mL, Intravenous, Q12H, Eric, Belinda, APRN    Allergies:   Allergies   Allergen Reactions    No Known Drug Allergy         The following portions of the patient's history were reviewed by me and updated as appropriate: review of systems, allergies, current medications, past family history, past medical history, past social history, past surgical history and problem list.    There were no vitals filed for this visit.    PHYSICAL EXAM:    CONSTITUTIONAL:  today's vital signs reviewed by me  GASTROINTESTINAL: abdomen is soft nontender nondistended with normal active bowel sounds, no masses are appreciated    Assessment/ Plan  Will proceed with colonoscopy.    Risks and benefits as well as alternatives to endoscopic evaluation were explained to the patient and they voiced understanding and wish to proceed.  These risks include but are not limited to the risk of bleeding, perforation, adverse reaction to sedation, and missed lesions.  The patient was given the opportunity to ask questions prior to the endoscopic procedure.

## 2023-12-14 NOTE — ANESTHESIA PREPROCEDURE EVALUATION
Anesthesia Evaluation     NPO Solid Status: > 8 hours  NPO Liquid Status: > 8 hours           Airway   Mallampati: I  No difficulty expected  Dental      Pulmonary    Cardiovascular   Exercise tolerance: good (4-7 METS)        Neuro/Psych  (+) psychiatric history  GI/Hepatic/Renal/Endo      Musculoskeletal     Abdominal    Substance History      OB/GYN          Other                    Anesthesia Plan    ASA 2     MAC     intravenous induction     Anesthetic plan, risks, benefits, and alternatives have been provided, discussed and informed consent has been obtained with: patient.    CODE STATUS:

## 2024-06-04 ENCOUNTER — OFFICE VISIT (OUTPATIENT)
Dept: SPORTS MEDICINE | Facility: CLINIC | Age: 39
End: 2024-06-04
Payer: COMMERCIAL

## 2024-06-04 VITALS
WEIGHT: 165 LBS | DIASTOLIC BLOOD PRESSURE: 72 MMHG | HEIGHT: 71 IN | HEART RATE: 84 BPM | SYSTOLIC BLOOD PRESSURE: 112 MMHG | BODY MASS INDEX: 23.1 KG/M2 | OXYGEN SATURATION: 98 % | TEMPERATURE: 97.1 F

## 2024-06-04 DIAGNOSIS — M25.551 RIGHT HIP PAIN: Primary | ICD-10-CM

## 2024-06-04 PROCEDURE — 99214 OFFICE O/P EST MOD 30 MIN: CPT | Performed by: FAMILY MEDICINE

## 2024-06-04 RX ORDER — NAPROXEN 500 MG/1
500 TABLET ORAL 2 TIMES DAILY WITH MEALS
Qty: 60 TABLET | Refills: 2 | Status: SHIPPED | OUTPATIENT
Start: 2024-06-04

## 2024-06-04 NOTE — PROGRESS NOTES
"Tawanda is a 39 y.o. year old male     Chief Complaint   Patient presents with    Hip Pain     RIGHT HIP- Patient is here for initial evaluation of right hip pain that started 8 weeks ago, NKI ir recent imaging. Worse when twisting motion.        History of Present Illness  HPI   Answers submitted by the patient for this visit:  Primary Reason for Visit (Submitted on 6/4/2024)  What is the primary reason for your visit?: Other  Other (Submitted on 6/4/2024)  Please describe your symptoms.: pain in hip, with certain motions and activity. Began noticing hip pain first week in April 2024  Have you had these symptoms before?: No  How long have you been having these symptoms?: Greater than 2 weeks  Please list any medications you are currently taking for this condition.: none  Please describe any probable cause for these symptoms. : I started noticing pain within 72 hours of doing some concrete work in the back yard. But, I didn't notice any hip pain while doing that work.    Here today for right hip pain that has been present for almost 2 months.  It started after working outside, doing some light concrete work in his backyard but denied specific injury just felt like overuse that day.  Pain has been present intermittently but daily since, deep in the groin, worse with twisting turning motions.    Review of Systems    /72 (BP Location: Left arm, Patient Position: Sitting, Cuff Size: Adult)   Pulse 84   Temp 97.1 °F (36.2 °C) (Temporal)   Ht 180.3 cm (71\")   Wt 74.8 kg (165 lb)   SpO2 98%   BMI 23.01 kg/m²      Physical Exam    Vital signs reviewed.   General: No acute distress.      MSK Exam:  Ortho Exam  Right hip: Normal appearance.  No reproducible tenderness palpation.  Normal range of motion but there is pain with FADIR position.  Negative MIL.  Negative Stinchfield.  Negative straight leg raise.  Negative logroll.  Normal strength with all planes of motion.    Right Hip X-Ray  Indication: Pain  AP " Pelvis; AP and Frog Leg dedicated hip views    Findings:  No fracture  No bony lesion  Normal soft tissues  Normal joint spaces  Mild acetabular over coverage bilaterally    No prior studies were available for comparison.     Procedures     Diagnoses and all orders for this visit:    Right hip pain  -     XR Hip With or Without Pelvis 2 - 3 View Right; Future  -     Ambulatory Referral to Physical Therapy for Evaluation & Treatment  -     naproxen (NAPROSYN) 500 MG tablet; Take 1 tablet by mouth 2 (Two) Times a Day With Meals.    Discussed considerations at length.  Overall this could potentially be an indicator of labrum pathology but not completely consistent on physical exam.  Will start with scheduled NSAIDs and physical therapy and track his progress in about 6 weeks for response.      EMR Dragon/Transcription disclaimer:    Much of this encounter note is an electronic transcription/translation of spoken language to printed text.  The electronic translation of spoken language may permit erroneous, or at times, nonsensical words or phrases to be inadvertently transcribed.  Although I have reviewed the note for such errors some may still exist.

## 2024-07-17 ENCOUNTER — PATIENT MESSAGE (OUTPATIENT)
Dept: SPORTS MEDICINE | Facility: CLINIC | Age: 39
End: 2024-07-17
Payer: COMMERCIAL

## 2024-07-17 DIAGNOSIS — M25.551 RIGHT HIP PAIN: Primary | ICD-10-CM

## 2024-07-17 NOTE — TELEPHONE ENCOUNTER
Tracy Kaufman MA 7/17/2024 10:26 AM EDT      ----- Message -----  From: Tawanda Isidro  Sent: 7/17/2024 10:26 AM EDT  To: [facility] Clinical Pool  Subject: Right Hip     Dr. Mora,   When I saw you on June 4, you suggested I do 6 weeks of physical therapy. I have now been doing PT at [facility] for 6 weeks, along with home exercises that they have recommended. Although I feel like my hip muscles are stronger, I'm still having pain (sometimes substantial) in my right hip with certain movements. I don't think the problem has gotten much better. Can you refer me to have an MRI? I was hoping to have it done before I see you in August.   Thanks,   Tawanda Isidro

## 2024-08-12 ENCOUNTER — LAB (OUTPATIENT)
Dept: LAB | Facility: HOSPITAL | Age: 39
End: 2024-08-12
Payer: COMMERCIAL

## 2024-08-12 ENCOUNTER — OFFICE VISIT (OUTPATIENT)
Dept: SPORTS MEDICINE | Facility: CLINIC | Age: 39
End: 2024-08-12
Payer: COMMERCIAL

## 2024-08-12 VITALS
DIASTOLIC BLOOD PRESSURE: 82 MMHG | OXYGEN SATURATION: 99 % | HEIGHT: 71 IN | BODY MASS INDEX: 23.38 KG/M2 | TEMPERATURE: 97.6 F | SYSTOLIC BLOOD PRESSURE: 120 MMHG | WEIGHT: 167 LBS | HEART RATE: 72 BPM

## 2024-08-12 DIAGNOSIS — Z00.00 ANNUAL PHYSICAL EXAM: Primary | ICD-10-CM

## 2024-08-12 DIAGNOSIS — Z12.5 SCREENING FOR PROSTATE CANCER: ICD-10-CM

## 2024-08-12 LAB
ALBUMIN SERPL-MCNC: 4.5 G/DL (ref 3.5–5.2)
ALBUMIN/GLOB SERPL: 1.5 G/DL
ALP SERPL-CCNC: 47 U/L (ref 39–117)
ALT SERPL W P-5'-P-CCNC: 40 U/L (ref 1–41)
ANION GAP SERPL CALCULATED.3IONS-SCNC: 8.3 MMOL/L (ref 5–15)
AST SERPL-CCNC: 23 U/L (ref 1–40)
BASOPHILS # BLD AUTO: 0.05 10*3/MM3 (ref 0–0.2)
BASOPHILS NFR BLD AUTO: 1.1 % (ref 0–1.5)
BILIRUB SERPL-MCNC: 0.6 MG/DL (ref 0–1.2)
BILIRUB UR QL STRIP: NEGATIVE
BUN SERPL-MCNC: 8 MG/DL (ref 6–20)
BUN/CREAT SERPL: 10.8 (ref 7–25)
CALCIUM SPEC-SCNC: 10 MG/DL (ref 8.6–10.5)
CHLORIDE SERPL-SCNC: 104 MMOL/L (ref 98–107)
CHOLEST SERPL-MCNC: 266 MG/DL (ref 0–200)
CLARITY UR: CLEAR
CO2 SERPL-SCNC: 27.7 MMOL/L (ref 22–29)
COLOR UR: YELLOW
CREAT SERPL-MCNC: 0.74 MG/DL (ref 0.76–1.27)
DEPRECATED RDW RBC AUTO: 37.8 FL (ref 37–54)
EGFRCR SERPLBLD CKD-EPI 2021: 118.2 ML/MIN/1.73
EOSINOPHIL # BLD AUTO: 0.09 10*3/MM3 (ref 0–0.4)
EOSINOPHIL NFR BLD AUTO: 2 % (ref 0.3–6.2)
ERYTHROCYTE [DISTWIDTH] IN BLOOD BY AUTOMATED COUNT: 11.9 % (ref 12.3–15.4)
GLOBULIN UR ELPH-MCNC: 3 GM/DL
GLUCOSE SERPL-MCNC: 106 MG/DL (ref 65–99)
GLUCOSE UR STRIP-MCNC: NEGATIVE MG/DL
HBA1C MFR BLD: 5.2 % (ref 4.8–5.6)
HCT VFR BLD AUTO: 45.8 % (ref 37.5–51)
HDLC SERPL QL: 6.49
HDLC SERPL-MCNC: 41 MG/DL (ref 40–60)
HGB BLD-MCNC: 15.2 G/DL (ref 13–17.7)
HGB UR QL STRIP.AUTO: NEGATIVE
IMM GRANULOCYTES # BLD AUTO: 0.03 10*3/MM3 (ref 0–0.05)
IMM GRANULOCYTES NFR BLD AUTO: 0.7 % (ref 0–0.5)
KETONES UR QL STRIP: NEGATIVE
LDLC SERPL CALC-MCNC: 176 MG/DL (ref 0–100)
LEUKOCYTE ESTERASE UR QL STRIP.AUTO: NEGATIVE
LYMPHOCYTES # BLD AUTO: 1.54 10*3/MM3 (ref 0.7–3.1)
LYMPHOCYTES NFR BLD AUTO: 33.6 % (ref 19.6–45.3)
MCH RBC QN AUTO: 29.3 PG (ref 26.6–33)
MCHC RBC AUTO-ENTMCNC: 33.2 G/DL (ref 31.5–35.7)
MCV RBC AUTO: 88.2 FL (ref 79–97)
MONOCYTES # BLD AUTO: 0.39 10*3/MM3 (ref 0.1–0.9)
MONOCYTES NFR BLD AUTO: 8.5 % (ref 5–12)
NEUTROPHILS NFR BLD AUTO: 2.48 10*3/MM3 (ref 1.7–7)
NEUTROPHILS NFR BLD AUTO: 54.1 % (ref 42.7–76)
NITRITE UR QL STRIP: NEGATIVE
NRBC BLD AUTO-RTO: 0 /100 WBC (ref 0–0.2)
PH UR STRIP.AUTO: 8.5 [PH] (ref 5–8)
PLATELET # BLD AUTO: 229 10*3/MM3 (ref 140–450)
PMV BLD AUTO: 10.9 FL (ref 6–12)
POTASSIUM SERPL-SCNC: 4.5 MMOL/L (ref 3.5–5.2)
PROT SERPL-MCNC: 7.5 G/DL (ref 6–8.5)
PROT UR QL STRIP: NEGATIVE
PSA SERPL-MCNC: 1.03 NG/ML (ref 0–4)
RBC # BLD AUTO: 5.19 10*6/MM3 (ref 4.14–5.8)
SODIUM SERPL-SCNC: 140 MMOL/L (ref 136–145)
SP GR UR STRIP: 1.02 (ref 1–1.03)
TRIGL SERPL-MCNC: 255 MG/DL (ref 0–150)
UROBILINOGEN UR QL STRIP: ABNORMAL
VLDLC SERPL-MCNC: 49 MG/DL (ref 5–40)
WBC NRBC COR # BLD AUTO: 4.58 10*3/MM3 (ref 3.4–10.8)

## 2024-08-12 PROCEDURE — 80061 LIPID PANEL: CPT | Performed by: FAMILY MEDICINE

## 2024-08-12 PROCEDURE — 81003 URINALYSIS AUTO W/O SCOPE: CPT | Performed by: FAMILY MEDICINE

## 2024-08-12 PROCEDURE — 84443 ASSAY THYROID STIM HORMONE: CPT | Performed by: FAMILY MEDICINE

## 2024-08-12 PROCEDURE — G0103 PSA SCREENING: HCPCS | Performed by: FAMILY MEDICINE

## 2024-08-12 PROCEDURE — 99395 PREV VISIT EST AGE 18-39: CPT | Performed by: FAMILY MEDICINE

## 2024-08-12 PROCEDURE — 85025 COMPLETE CBC W/AUTO DIFF WBC: CPT | Performed by: FAMILY MEDICINE

## 2024-08-12 PROCEDURE — 80053 COMPREHEN METABOLIC PANEL: CPT | Performed by: FAMILY MEDICINE

## 2024-08-12 PROCEDURE — 36415 COLL VENOUS BLD VENIPUNCTURE: CPT | Performed by: FAMILY MEDICINE

## 2024-08-12 PROCEDURE — 83036 HEMOGLOBIN GLYCOSYLATED A1C: CPT | Performed by: FAMILY MEDICINE

## 2024-08-12 NOTE — PROGRESS NOTES
"Tawanda Isidro is here today for an annual physical exam.     Overall well without active concerns.  Hip pain improved with physical therapy.    PHQ-2 Depression Screening  Little interest or pleasure in doing things? 0-->not at all   Feeling down, depressed, or hopeless? 0-->not at all   PHQ-2 Total Score 0       Health Maintenance   Topic Date Due    HEPATITIS C SCREENING  Never done    COVID-19 Vaccine (6 - 2023-24 season) 09/01/2023    ANNUAL PHYSICAL  05/09/2024    INFLUENZA VACCINE  08/01/2024    LIPID PANEL  08/12/2025    TDAP/TD VACCINES (2 - Td or Tdap) 06/30/2032    Pneumococcal Vaccine 0-64  Aged Out       Review of Systems    /82 (BP Location: Left arm, Patient Position: Sitting, Cuff Size: Adult)   Pulse 72   Temp 97.6 °F (36.4 °C) (Temporal)   Ht 180.3 cm (71\")   Wt 75.8 kg (167 lb)   SpO2 99%   BMI 23.29 kg/m²      BMI is within normal parameters. No other follow-up for BMI required.      Physical Exam    Vital signs reviewed.  General appearance: No acute distress  Eyes: conjunctiva clear without erythema; pupils equally round and reactive  ENT: external ears normal; hearing normal  Neck: supple; no thyromegaly  CV: normal rate and rhythm; no peripheral edema  Respiratory: normal respiratory effort; lungs clear to auscultation bilaterally  MSK: normal gait and station; no focal joint deformity or swelling  Skin: no rash or wounds; normal turgor  Neuro: cranial nerves 2-12 grossly intact; normal sensation to light touch  Psych: mood and affect normal; recent and remote memory intact    No current outpatient medications on file.    Diagnoses and all orders for this visit:    1. Annual physical exam (Primary)  -     CBC & Differential  -     Comprehensive Metabolic Panel  -     Lipid Panel With / Chol / HDL Ratio  -     Thyroid Cascade Profile  -     Urinalysis With Culture If Indicated - Urine, Clean Catch  -     Hemoglobin A1c  -     PSA Screen  -     Cancel: [place] Urine Culture Tube " - Urine, Clean Catch    2. Screening for prostate cancer  -     PSA Screen        Encourage healthy diet and exercise.  Encourage patient to stay up to date on screening examinations as indicated based on age and risk factors.    EMR Dragon/Transcription disclaimer:    Much of this encounter note is an electronic transcription/translation of spoken language to printed text.  The electronic translation of spoken language may permit erroneous, or at times, nonsensical words or phrases to be inadvertently transcribed.  Although I have reviewed the note for such errors some may still exist.

## 2024-08-13 LAB — TSH SERPL DL<=0.005 MIU/L-ACNC: 1.17 UIU/ML (ref 0.45–4.5)

## 2024-08-21 NOTE — PROGRESS NOTES
I called the patient and relayed the results above per the physician. Patient verbalized all understanding of the results above and didn't have any further request or questions at this time. No further action needed.    ABISAI Cueva

## (undated) DEVICE — ADAPT CLN SCPE ENDO PORPOISE BX/50 DISP

## (undated) DEVICE — KT ORCA ORCAPOD DISP STRL

## (undated) DEVICE — Device

## (undated) DEVICE — CANN O2 ETCO2 FITS ALL CONN CO2 SMPL A/ 7IN DISP LF

## (undated) DEVICE — GOWN ISOL W/THUMB UNIV BLU BX/15

## (undated) DEVICE — GOWN ,SIRUS,NONREINFORCED 3XL: Brand: MEDLINE

## (undated) DEVICE — FLEX ADVANTAGE 1500CC: Brand: FLEX ADVANTAGE